# Patient Record
Sex: MALE | Race: ASIAN | Employment: FULL TIME | URBAN - METROPOLITAN AREA
[De-identification: names, ages, dates, MRNs, and addresses within clinical notes are randomized per-mention and may not be internally consistent; named-entity substitution may affect disease eponyms.]

---

## 2022-01-24 ENCOUNTER — MYC MEDICAL ADVICE (OUTPATIENT)
Dept: FAMILY MEDICINE | Facility: CLINIC | Age: 32
End: 2022-01-24

## 2022-01-24 ENCOUNTER — VIRTUAL VISIT (OUTPATIENT)
Dept: FAMILY MEDICINE | Facility: CLINIC | Age: 32
End: 2022-01-24
Payer: COMMERCIAL

## 2022-01-24 DIAGNOSIS — R19.7 DIARRHEA, UNSPECIFIED TYPE: ICD-10-CM

## 2022-01-24 DIAGNOSIS — U07.1 INFECTION DUE TO 2019 NOVEL CORONAVIRUS: Primary | ICD-10-CM

## 2022-01-24 PROCEDURE — 99203 OFFICE O/P NEW LOW 30 MIN: CPT | Mod: 95 | Performed by: PHYSICIAN ASSISTANT

## 2022-01-24 NOTE — PROGRESS NOTES
"Catracho is a 31 year old who is being evaluated via a billable video visit.      How would you like to obtain your AVS? Mail a copy  If the video visit is dropped, the invitation should be resent by: Text to cell phone: [phone]  Will anyone else be joining your video visit? No    Video Start Time: Start: 01/24/2022 03:25 pm  Stop: 01/24/2022 04:05 pm    Assessment & Plan     Catracho was seen today for patient request for note/letter.    Diagnoses and all orders for this visit:    Infection due to 2019 novel coronavirus    Diarrhea, unspecified type    New pt to me today with report of COVID19 positive test and sx onset on 1/12. Overall feeling improved and afebrile excluding has diarrhea so was advised to follow-up to complete STD paperwork prior to returning to work. Advised to have paperwork sent on [employer] or faxed for review. Recheck sx on Wednesday when I'm back in the office prior to faxing in forms. Encouraged bland diet, hydration, supportive measures in the meantime. Patient in agreement with plan.     Return in about 2 days (around 1/26/2022) for review paperwork.    Charity Dwyer PA-C  [facility] [place]    Subjective   Catracho is a 31 year old who presents for the following health issues     Moved to [place] 2 yrs ago - generally healthy so hasn't sought care before now.     HPI   Recheck COVID19 symptoms.   Sx onset was 1/12 - body aches/sore, fever of 100.3, chills, HA, muscles aches. Felt swollen externally around his throat and no matter how much water he drank, throat continued to stay scratchy/itchy. Checked with home kit and was positive. Reports he also went in at a COVID19 test site and was positive again on 1/17.  Overall feels he's \"doing good\" - reports no longer having chills or fevers.  Sx persisted including HA - describes as a pulsing type of pain, bilateral temples. Generally occurring daily starting a couple days after he tested positive - initially rated pain " "as 1-2/10. Over past 3 days seems HA is more pronounced. Rates 4-5/10. Lasts for hours/majority of the day, but is tolerable. Has taken tylenol 500mg periodically, but \"I don't want to depend on it\". No head trauma. No light or sound sensitivity. Not first or worst. Has not tried anything else except for the tylenol.  1/20 developed diarrhea - 6-8 x that day; watery. Yesterday 6x per day. Today 3x per day so far - more mushy, less watery/more formed overall. No blood or mucous. Baseline stool pattern 1x per day - formed stool. Denies any changes in diet. No other exposures to illnesses or travel.   Denies any hx of HAs or chronic issues with diarrhea prior to COVID.     PMHx - injured back in 2019 shoveling snow. Will have chronic intermittent since then if bends over too long or if he shovels snow.  Went to gym to talk with trainers and did have an evaluation with a physician about this and was recommended to have PT. Has not done it since not finding pain too limiting or bothersome now.    Reports employer requires them to file for STD related to COVID19 illness so main concern today is have paperwork completed for this.     Was supposed to return on 1/20, but given he developed diarrhea they had advised him to continue to stay home. Had their own CMT group guidelines and was told he must be diarrhea - free for 48 hours prior to return. Needs updated paperwork/FMLA to extend leave.     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      No current outpatient medications on file.     No current facility-administered medications for this visit.      No Known Allergies      Review of Systems   Constitutional, HEENT, cardiovascular, pulmonary, gi and gu systems are negative, except as otherwise noted.      Objective           Vitals:  No vitals were obtained today due to virtual " visit.    Physical Exam   GENERAL: Healthy, alert and no distress  EYES: Eyes grossly normal to inspection.  No discharge or erythema, or obvious scleral/conjunctival abnormalities.  RESP: No audible wheeze, cough, or visible cyanosis.  No visible retractions or increased work of breathing.    SKIN: Visible skin clear. No significant rash, abnormal pigmentation or lesions.  NEURO: Cranial nerves grossly intact.  Mentation and speech appropriate for age.  PSYCH: Mentation appears normal, affect normal/bright, judgement and insight intact, normal speech and appearance well-groomed.              Video-Visit Details    Type of service:  Video Visit    Video End Time:Start: 01/24/2022 03:25 pm  Stop: 01/24/2022 04:05 pm    Originating Location (pt. Location): Home    Distant Location (provider location):  [facility]     Platform used for Video Visit: Carmudi

## 2022-01-26 NOTE — TELEPHONE ENCOUNTER
Please see my chart message below     Please review and advise     Thank you     Kelly Alexandre RN, BSN  [facility]

## 2022-01-26 NOTE — TELEPHONE ENCOUNTER
Spoke to pt who reports he continues to notice some improvement in stool consistency. First couple stools generally more formed/mushy bristol type 6, but then remaining stools for the day will still be liquid bristol type 7. 4x today so far - seems to be trending down to 5-6x per day overall past 2 days. Paperwork completed for STD per our previous discussion. Please fax.  Electronically Signed By: Charity Dwyer PA-C

## 2022-02-02 ENCOUNTER — OFFICE VISIT (OUTPATIENT)
Dept: FAMILY MEDICINE | Facility: CLINIC | Age: 32
End: 2022-02-02
Payer: COMMERCIAL

## 2022-02-02 VITALS
BODY MASS INDEX: 30.36 KG/M2 | WEIGHT: 165 LBS | DIASTOLIC BLOOD PRESSURE: 72 MMHG | TEMPERATURE: 97.1 F | HEART RATE: 92 BPM | HEIGHT: 62 IN | OXYGEN SATURATION: 100 % | SYSTOLIC BLOOD PRESSURE: 122 MMHG

## 2022-02-02 DIAGNOSIS — U07.1 INFECTION DUE TO 2019 NOVEL CORONAVIRUS: Primary | ICD-10-CM

## 2022-02-02 DIAGNOSIS — R19.7 DIARRHEA, UNSPECIFIED TYPE: ICD-10-CM

## 2022-02-02 PROCEDURE — 99213 OFFICE O/P EST LOW 20 MIN: CPT | Performed by: PHYSICIAN ASSISTANT

## 2022-02-02 ASSESSMENT — ANXIETY QUESTIONNAIRES
7. FEELING AFRAID AS IF SOMETHING AWFUL MIGHT HAPPEN: NOT AT ALL
3. WORRYING TOO MUCH ABOUT DIFFERENT THINGS: NOT AT ALL
2. NOT BEING ABLE TO STOP OR CONTROL WORRYING: NOT AT ALL
GAD7 TOTAL SCORE: 0
5. BEING SO RESTLESS THAT IT IS HARD TO SIT STILL: NOT AT ALL
6. BECOMING EASILY ANNOYED OR IRRITABLE: NOT AT ALL
GAD7 TOTAL SCORE: 0
7. FEELING AFRAID AS IF SOMETHING AWFUL MIGHT HAPPEN: NOT AT ALL
1. FEELING NERVOUS, ANXIOUS, OR ON EDGE: NOT AT ALL
4. TROUBLE RELAXING: NOT AT ALL
GAD7 TOTAL SCORE: 0

## 2022-02-02 ASSESSMENT — ENCOUNTER SYMPTOMS: DIARRHEA: 1

## 2022-02-02 ASSESSMENT — PATIENT HEALTH QUESTIONNAIRE - PHQ9
SUM OF ALL RESPONSES TO PHQ QUESTIONS 1-9: 1
10. IF YOU CHECKED OFF ANY PROBLEMS, HOW DIFFICULT HAVE THESE PROBLEMS MADE IT FOR YOU TO DO YOUR WORK, TAKE CARE OF THINGS AT HOME, OR GET ALONG WITH OTHER PEOPLE: NOT DIFFICULT AT ALL
SUM OF ALL RESPONSES TO PHQ QUESTIONS 1-9: 1

## 2022-02-02 ASSESSMENT — MIFFLIN-ST. JEOR: SCORE: 1569.75

## 2022-02-02 NOTE — LETTER
[address] [address]  Phone: [phone]    February 2, 2022        Catracho Erwin  [address] [place] [id number]          To whom it may concern:    RE: Catracho Erwin    This patient is followed by our clinic for his care. This letter serves as an extension to previously completed Attending Physician Statement dated 1/26/2021. Please extend patients medical leave to 2/9/2022 for COVID19 infection and diarrhea.     Please contact me for questions or concerns.      Sincerely,        Charity Dwyer PA-C

## 2022-02-02 NOTE — PROGRESS NOTES
Assessment & Plan     Catracho was seen today for diarrhea.    Diagnoses and all orders for this visit:    Infection due to 2019 novel coronavirus    Diarrhea, unspecified type    Reviewed with pt that it seems less likely persistent active infection vs more of a post-infectious inflammation given he's now past 20 days since beginning of COVID19 infection and not immunosuppressed. Reassuring vitals, exam and fortunately frequency and stool consistency does seem to be improving overall. Reviewed that it's still unclear how long symptoms may last with COVID and what the appropriate timeframe would be to consider further work-up with avoidance of unnecessary cost. Given his trend towards improvement and that we see majority of sx improve after 30 days, will extend leave for another week in hopes this resolves. In the meantime, I've suggested he try taking a fiber supplement and probiotic to see if this helps. Recheck in 1 week. If returns to baseline no need for follow-up visit and can just provide return to work clearance. If persistent issues, would recommend follow-up visit to consider further labs at that time to evaluate other infectious sources vs metabolic causes. Patient in agreement with plan.     Return in about 1 week (around 2/9/2022) for recheck symptoms.    ISSAC [facility] NATALYA Ritchie is a 31 year old who presents for the following health issues     Diarrhea    History of Present Illness       He eats 0-1 servings of fruits and vegetables daily.He consumes 1 sweetened beverage(s) daily.He exercises with enough effort to increase his heart rate 20 to 29 minutes per day.  He exercises with enough effort to increase his heart rate 5 days per week.   He is taking medications regularly.     Answers for HPI/ROS submitted by the patient on 2/2/2022  If you checked off any problems, how difficult have these problems made it for you to do your work, take  "care of things at home, or get along with other people?: Not difficult at all  PHQ9 TOTAL SCORE: 1  PHYLLIS 7 TOTAL SCORE: 0      Diarrhea  Onset/Duration: COVID19 onset 1/12, but diarrhea since 1/20/2022  Description:       Consistency of stool: continues to be the same \"mushy\" liquid - starts out as mostly bristol type 6 then still transitions to type 7.  Yesterday was the first day he had a bristol type 2, but then it still transitioned to a 7. Had some abdominal pain and cramping prior to stool, but resolved after the stool.         Blood in stool: no       Number of loose stools past 24 hours: happening less frequently then it did - now down to 4x per day.   Progression of Symptoms: same  Accompanying signs and symptoms:       Fever: no       Nausea/Vomiting: no       Abdominal pain: no       Weight loss: no       Episodes of constipation: no  History   Ill contacts: no  Recent use of antibiotics: no  Recent travels: no  Recent medication-new or changes(Rx or OTC): no  Precipitating or alleviating factors: None  Therapies tried and outcome: TUMs      Review of Systems   Gastrointestinal: Positive for diarrhea.      Constitutional, HEENT, cardiovascular, pulmonary, gi and gu systems are negative, except as otherwise noted.      Objective    /72 (BP Location: Left arm, Cuff Size: Adult Regular)   Pulse 92   Temp 97.1  F (36.2  C) (Tympanic)   Ht 1.562 m (5' 1.5\")   Wt 74.8 kg (165 lb)   SpO2 100%   BMI 30.67 kg/m    Body mass index is 30.67 kg/m .  Physical Exam   GENERAL: healthy, alert and no distress  EYES: Eyes grossly normal to inspection, PERRL and conjunctivae and sclerae normal  HENT: ear canals and TM's normal, nose and mouth without ulcers or lesions  NECK: no adenopathy, no asymmetry, masses, or scars and thyroid normal to palpation  RESP: lungs clear to auscultation - no rales, rhonchi or wheezes  CV: regular rate and rhythm, normal S1 S2, no S3 or S4, no murmur, click or rub, no peripheral " edema and peripheral pulses strong  ABDOMEN: soft, nontender, no hepatosplenomegaly, no masses and bowel sounds normal

## 2022-02-03 ASSESSMENT — PATIENT HEALTH QUESTIONNAIRE - PHQ9: SUM OF ALL RESPONSES TO PHQ QUESTIONS 1-9: 1

## 2022-02-03 ASSESSMENT — ANXIETY QUESTIONNAIRES: GAD7 TOTAL SCORE: 0

## 2022-02-06 ENCOUNTER — HEALTH MAINTENANCE LETTER (OUTPATIENT)
Age: 32
End: 2022-02-06

## 2022-02-09 ENCOUNTER — TELEPHONE (OUTPATIENT)
Dept: FAMILY MEDICINE | Facility: CLINIC | Age: 32
End: 2022-02-09

## 2022-02-09 ENCOUNTER — OFFICE VISIT (OUTPATIENT)
Dept: FAMILY MEDICINE | Facility: CLINIC | Age: 32
End: 2022-02-09
Payer: COMMERCIAL

## 2022-02-09 VITALS
HEART RATE: 84 BPM | BODY MASS INDEX: 30.91 KG/M2 | DIASTOLIC BLOOD PRESSURE: 80 MMHG | TEMPERATURE: 97.2 F | OXYGEN SATURATION: 98 % | HEIGHT: 62 IN | WEIGHT: 168 LBS | SYSTOLIC BLOOD PRESSURE: 118 MMHG

## 2022-02-09 DIAGNOSIS — U07.1 INFECTION DUE TO 2019 NOVEL CORONAVIRUS: Primary | ICD-10-CM

## 2022-02-09 DIAGNOSIS — R19.7 DIARRHEA, UNSPECIFIED TYPE: ICD-10-CM

## 2022-02-09 PROCEDURE — 99213 OFFICE O/P EST LOW 20 MIN: CPT | Performed by: NURSE PRACTITIONER

## 2022-02-09 ASSESSMENT — PATIENT HEALTH QUESTIONNAIRE - PHQ9
SUM OF ALL RESPONSES TO PHQ QUESTIONS 1-9: 0
SUM OF ALL RESPONSES TO PHQ QUESTIONS 1-9: 0
10. IF YOU CHECKED OFF ANY PROBLEMS, HOW DIFFICULT HAVE THESE PROBLEMS MADE IT FOR YOU TO DO YOUR WORK, TAKE CARE OF THINGS AT HOME, OR GET ALONG WITH OTHER PEOPLE: NOT DIFFICULT AT ALL

## 2022-02-09 ASSESSMENT — MIFFLIN-ST. JEOR: SCORE: 1583.35

## 2022-02-09 NOTE — PROGRESS NOTES
"  Assessment & Plan     Catracho was seen today for diarrhea.    Diagnoses and all orders for this visit:    Infection due to 2019 novel coronavirus  Diarrhea, unspecified type  Noted improvement in loose stools.    Continue with dietary fiber supplement and probiotic use.   Letter and FMLA/short term disability forms updated with patient.        Tobacco Cessation:   reports that he has been smoking other. He started smoking about 16 years ago. He has been smoking about 0.00 packs per day for the past 10.00 years. He has never used smokeless tobacco.    BMI:   Estimated body mass index is 31.23 kg/m  as calculated from the following:    Height as of this encounter: 1.562 m (5' 1.5\").    Weight as of this encounter: 76.2 kg (168 lb).       Return in about 3 months (around 5/9/2022) for Preventative Visit.    LYNN Collins [facility] NATALYA Ritchie is a 31 year old who presents for the following health issues  accompanied by his spouse.    HPI     Answers for HPI/ROS submitted by the patient on 2/9/2022  If you checked off any problems, how difficult have these problems made it for you to do your work, take care of things at home, or get along with other people?: Not difficult at all  PHQ9 TOTAL SCORE: 0    -follow-up from diarrhea - has definitely improved.  Very close to everything being gone and back to normal  Worse part was difficulty with sitting down due to skin irritation in rectal area from frequent bowel movements, this has improved as well    Started probiotic and Metamucil about one week ago.  Improvement in symptoms.    Currently with 4 soft bowel movements daily, no frequent loose, watery stools.    Denies nausea or vomiting.        Review of Systems   Constitutional, HEENT, cardiovascular, pulmonary, gi and gu systems are negative, except as otherwise noted.      Objective    /80 (BP Location: Left arm, Cuff Size: Adult Regular)   Pulse 84   Temp " "97.2  F (36.2  C) (Tympanic)   Ht 1.562 m (5' 1.5\")   Wt 76.2 kg (168 lb)   SpO2 98%   BMI 31.23 kg/m    Body mass index is 31.23 kg/m .  Physical Exam   GENERAL: healthy, alert and no distress  RESP: lungs clear to auscultation - no rales, rhonchi or wheezes  CV: regular rate and rhythm, normal S1 S2  ABDOMEN: soft, nontender, bowel sounds normal  PSYCH: mentation appears normal, affect normal/bright      "

## 2022-02-09 NOTE — LETTER
February 9, 2022      Brendadea Yaima  [address]  [place] [address]        To Whom It May Concern:    This patient is followed by our clinic for his care. This letter serves as an extension to previously completed Attending Physician Statement.  Please extend patients medical leave to 2/13/2022 for COVID19 infection and diarrhea. May return to work on 2/14/2022 without restrictions.       Please contact me for questions or concerns.       Sincerely,        LYNN Collins CNP

## 2022-02-10 ASSESSMENT — PATIENT HEALTH QUESTIONNAIRE - PHQ9: SUM OF ALL RESPONSES TO PHQ QUESTIONS 1-9: 0

## 2022-10-03 ENCOUNTER — HEALTH MAINTENANCE LETTER (OUTPATIENT)
Age: 32
End: 2022-10-03

## 2023-02-12 ENCOUNTER — HEALTH MAINTENANCE LETTER (OUTPATIENT)
Age: 33
End: 2023-02-12

## 2024-03-10 ENCOUNTER — HEALTH MAINTENANCE LETTER (OUTPATIENT)
Age: 34
End: 2024-03-10